# Patient Record
Sex: FEMALE | URBAN - METROPOLITAN AREA
[De-identification: names, ages, dates, MRNs, and addresses within clinical notes are randomized per-mention and may not be internally consistent; named-entity substitution may affect disease eponyms.]

---

## 2023-07-11 ENCOUNTER — OCCMED (OUTPATIENT)
Dept: URGENT CARE | Facility: CLINIC | Age: 33
End: 2023-07-11

## 2023-07-11 ENCOUNTER — APPOINTMENT (OUTPATIENT)
Dept: LAB | Facility: CLINIC | Age: 33
End: 2023-07-11

## 2023-07-11 DIAGNOSIS — Z02.1 PRE-EMPLOYMENT HEALTH SCREENING EXAMINATION: ICD-10-CM

## 2023-07-11 DIAGNOSIS — Z02.1 PRE-EMPLOYMENT HEALTH SCREENING EXAMINATION: Primary | ICD-10-CM

## 2023-07-11 LAB
MEV IGG SER QL IA: ABNORMAL
MUV IGG SER QL IA: NORMAL
RUBV IGG SERPL IA-ACNC: 18.4 IU/ML
VZV IGG SER QL IA: ABNORMAL

## 2023-07-11 PROCEDURE — 86735 MUMPS ANTIBODY: CPT

## 2023-07-11 PROCEDURE — 86765 RUBEOLA ANTIBODY: CPT

## 2023-07-11 PROCEDURE — 36415 COLL VENOUS BLD VENIPUNCTURE: CPT

## 2023-07-11 PROCEDURE — 86762 RUBELLA ANTIBODY: CPT

## 2023-07-11 PROCEDURE — 86787 VARICELLA-ZOSTER ANTIBODY: CPT

## 2023-07-11 PROCEDURE — 86480 TB TEST CELL IMMUN MEASURE: CPT

## 2023-07-13 LAB
GAMMA INTERFERON BACKGROUND BLD IA-ACNC: 0.04 IU/ML
M TB IFN-G BLD-IMP: NEGATIVE
M TB IFN-G CD4+ BCKGRND COR BLD-ACNC: 0.06 IU/ML
M TB IFN-G CD4+ BCKGRND COR BLD-ACNC: 0.07 IU/ML
MITOGEN IGNF BCKGRD COR BLD-ACNC: 2.3 IU/ML

## 2023-11-03 ENCOUNTER — HOSPITAL ENCOUNTER (EMERGENCY)
Facility: HOSPITAL | Age: 33
Discharge: HOME | End: 2023-11-03
Attending: EMERGENCY MEDICINE
Payer: OTHER MISCELLANEOUS

## 2023-11-03 VITALS
TEMPERATURE: 98.5 F | SYSTOLIC BLOOD PRESSURE: 123 MMHG | DIASTOLIC BLOOD PRESSURE: 60 MMHG | HEART RATE: 65 BPM | OXYGEN SATURATION: 99 % | RESPIRATION RATE: 18 BRPM
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
## 2023-11-03 LAB
ALT SERPL W P-5'-P-CCNC: 17 U/L (ref 7–52)
HBV SURFACE AB SER-ACNC: 62.4 MIU/ML
HBV SURFACE AG SER QL: NORMAL
HCV AB SER QL: NORMAL
HIV 1+2 AB+HIV1 P24 AG SERPL QL IA: NORMAL
HIV 2 AB SERPL QL IA: NORMAL
HIV1 AB SERPL QL IA: NORMAL
HIV1 P24 AG SERPL QL IA: NORMAL

## 2023-11-03 PROCEDURE — 86803 HEPATITIS C AB TEST: CPT

## 2023-11-03 PROCEDURE — 36415 COLL VENOUS BLD VENIPUNCTURE: CPT

## 2023-11-03 PROCEDURE — 84460 ALANINE AMINO (ALT) (SGPT): CPT

## 2023-11-03 PROCEDURE — 99284 EMERGENCY DEPT VISIT MOD MDM: CPT | Performed by: EMERGENCY MEDICINE

## 2023-11-03 PROCEDURE — 87389 HIV-1 AG W/HIV-1&-2 AB AG IA: CPT

## 2023-11-03 PROCEDURE — 99282 EMERGENCY DEPT VISIT SF MDM: CPT

## 2023-11-03 PROCEDURE — 86706 HEP B SURFACE ANTIBODY: CPT

## 2023-11-03 PROCEDURE — 87340 HEPATITIS B SURFACE AG IA: CPT

## 2023-11-03 NOTE — ED PROVIDER NOTES
Emergency Department Employee Health Note  Isabella Zhou 28 y.o. female MRN: [id number]  Unit/Bed#: ED 05/ED 05 Encounter: [id number]        History of Present Illness     Chief Complaint:   Chief Complaint   Patient presents with    Infectious Exposure - Needlestick     Pt had needle stick at work- left ring finger     HPI:  Jatinder Silva is a 28 y.o. female who presents with Needle stick injury. Mechanism:           Patient is a 16-year-old female no significant past medical history presenting with needlestick injury. Patient was doing a biopsy earlier in the day and then got back to her computer and noticed her finger was bleeding. Patient immediately cleaned the area resistant to changes. She then came to the emergency department for evaluation. Patient has no other complaints at this time. She denies headache, lightheadedness, chest pain, shortness of breath, abdominal pain, nausea, vomiting, diarrhea, urinary symptoms, numbness, tingling, or weakness. Review of Systems   All other systems reviewed and are negative. See HPI    Historical Information     Immunizations: There is no immunization history on file for this patient. History reviewed. No pertinent past medical history. History reviewed. No pertinent family history. History reviewed. No pertinent surgical history. Social History     Tobacco Use    Smoking status: Never    Smokeless tobacco: Never   Substance Use Topics    Alcohol use: Yes     Alcohol/week: 1.0 standard drink of alcohol     Types: 1 Glasses of wine per week     E-Cigarette/Vaping     E-Cigarette/Vaping Substances         Meds/Allergies   Prior to Admission Medications   Prescriptions Last Dose Informant Patient Reported?  Taking?   metFORMIN (GLUCOPHAGE) 500 mg tablet 11/2/2023  Yes Yes   Sig: Take 500 mg by mouth 2 (two) times a day with meals      Facility-Administered Medications: None       No Known Allergies    PHYSICAL EXAM  Physical Exam  Vitals and nursing note reviewed. Constitutional:       General: She is not in acute distress. Appearance: Normal appearance. She is not ill-appearing, toxic-appearing or diaphoretic. HENT:      Head: Normocephalic and atraumatic. Nose: Nose normal.      Mouth/Throat:      Mouth: Mucous membranes are moist.      Pharynx: Oropharynx is clear. Eyes:      Extraocular Movements: Extraocular movements intact. Cardiovascular:      Rate and Rhythm: Normal rate. Pulmonary:      Effort: Pulmonary effort is normal. No respiratory distress. Musculoskeletal:         General: Normal range of motion. Cervical back: Normal range of motion. Skin:     General: Skin is warm and dry. Capillary Refill: Capillary refill takes less than 2 seconds. Comments: Needlestick injury not visible during exam, but patient showed a picture on her cell phone showing small amount of blood on left ring finger   Neurological:      General: No focal deficit present. Mental Status: She is alert and oriented to person, place, and time. Psychiatric:         Mood and Affect: Mood normal.         Behavior: Behavior normal.         Thought Content:  Thought content normal.         Judgment: Judgment normal.         Vital Signs  ED Triage Vitals [11/03/23 1228]   Temperature Pulse Respirations Blood Pressure SpO2   98.5 °F (36.9 °C) 65 18 123/60 99 %      Temp Source Heart Rate Source Patient Position - Orthostatic VS BP Location FiO2 (%)   Oral Monitor Sitting Right arm --      Pain Score       No Pain           Vitals:    11/03/23 1228   BP: 123/60   Pulse: 65   Patient Position - Orthostatic VS: Sitting         Certification of Exposure:    (link to [address] [place] [address]: [address] [place] [address] ([email or web address]): find link to BBP Exposure Instructions under important links on center of the page)    The patient is stable and has a history and physical exam consistent with an [facility] Injury and based on my assessment this exposure is not significant. If “NonSignificant” nothing further needs to be filled out. If "Significant" please continue with the following questions    For Significant Exposures All of the following items must be completed:     Source Patient Name: Erika Kong Patient MRN: [id number]  Was the Source Patient's Provider contacted No   Was "Exposure Panel - Source" ordered (including Rapid HIV, HBsAg and anti-HCV) for Source Patient: No    Exposure Information    Type of Body Fluid Exposure: blood    Estimated volume of fluid: small up to 5cc     Exposure area: other: Finger needle stick    Skin Integrity: punctured    ED provider should review source patient chart for known history of HIV, Hepatitis B and Hepatitis C infection    Source patient HIV positive: No  Was the On-call Infectious Disease Provider contacted: No  Discussed treatment options with/for employee: Yes      There is no immunization history on file for this patient. Hepatitis B Vaccine History: There is no immunization history on file for this patient. The patient has Previously been vaccinated for Hepatitis B. HEPATITIS B IMMUNE GLOBULIN:  Not Indicated    Tetanus Vaccine History:    TDAP vaccination date: "within last 10 years"    The patient has Declined Tdap today    Employee/Patient post exposure testing Has been performed. "Exposure Panel - Employee Baseline"  (includes HBsAg, HBsAb, anti-HCV, ALT, HIV) with verbal consent and pretesting counseling for the patient Has been ordered.     Post-exposure Prophylaxis treatment options were discussed today: Not Indicated   and Nonsignificant      Visual Acuity      ED Medications  Medications - No data to display    Diagnostic Studies  Results Reviewed       Procedure Component Value Units Date/Time    ALT [[id number]]  (Normal) Collected: 11/03/23 1323    Lab Status: Final result Specimen: Blood from Arm, Left Updated: 11/03/23 1407     ALT 17 U/L     Hepatitis C antibody [[id number]] Collected: 11/03/23 1323    Lab Status: In process Specimen: Blood from Arm, Left Updated: 11/03/23 1328    Hepatitis B surface antibody [[id number]] Collected: 11/03/23 1323    Lab Status: In process Specimen: Blood from Arm, Left Updated: 11/03/23 1328    Hepatitis B surface antigen [[id number]] Collected: 11/03/23 1323    Lab Status: In process Specimen: Blood from Arm, Left Updated: 11/03/23 1328    HIV 1/2 AG/AB w Reflex [facility] for 2 yr old and above [[id number]] Collected: 11/03/23 1323    Lab Status: In process Specimen: Blood from Arm, Left Updated: 11/03/23 1328               No orders to display              Procedures  Procedures         Medical Decision Making  Patient is a 79-year-old female presenting for needlestick injury. Postexposure panel ordered. Patient was unable to get exposure panel from the original patient because she did not notice that she had a needlestick until the other patient was gone. Patient states that she is up-to-date with tetanus and hepatitis vaccines. Patient was cleared for discharge with occupational medicine follow-up. Amount and/or Complexity of Data Reviewed  Labs: ordered. Disposition  Final diagnoses:   Needlestick injury accident with exposure to body fluid     Time reflects when diagnosis was documented in both MDM as applicable and the Disposition within this note       Time User Action Codes Description Comment    11/3/2023  1:08 PM Maggie Ulloa.Elvie. 1XXA] Needlestick injury accident with exposure to body fluid           ED Disposition       ED Disposition   Discharge    Condition   Stable    Date/Time   Fri Nov 3, 2023  1:08 PM    Comment   Babs Zhou discharge to home/self care.                    Follow-up Information    None         Discharge Medication List as of 11/3/2023  1:09 PM        CONTINUE these medications which have NOT CHANGED    Details   metFORMIN (GLUCOPHAGE) 500 mg tablet Take 500 mg by mouth 2 (two) times a day with meals, Historical Med                 PDMP Review       None              ED Provider  Electronically Signed by               Elsie Alvarez MD  11/03/23 8778

## 2023-11-06 NOTE — ED ATTENDING ATTESTATION
11/3/2023  ITrace DO, saw and evaluated the patient. I have discussed the patient with the resident/non-physician practitioner and agree with the resident's/non-physician practitioner's findings, Plan of Care, and MDM as documented in the resident's/non-physician practitioner's note, except where noted. All available labs and Radiology studies were reviewed. I was present for key portions of any procedure(s) performed by the resident/non-physician practitioner and I was immediately available to provide assistance. At this point I agree with the current assessment done in the Emergency Department. I have conducted an independent evaluation of this patient a history and physical is as follows:    78-year-old female presents with needlestick injury. Patient was doing a biopsy earlier in the day and when she returned to her computer noted that her finger was bleeding. She immediately cleaned the area. Source patient had already been discharged as this was a same-day procedure. Patient denies other complaints. On exam-no acute distress, heart regular, no respiratory distress, no obvious bleeding or wound noted on finger at this time.   Plan-we will draw exposure panel labs, refer to employee health    ED Course         Critical Care Time  Procedures

## 2024-02-19 LAB
HBV SURFACE AG SER QL: NONREACTIVE
HCV AB SER QL: NONREACTIVE
HIV 1+2 AB+HIV1 P24 AG SERPL QL IA: NONREACTIVE
QST CHLAMYDIA TRACHOMATIS RNA, TMA: NEGATIVE
QST NEISSERIA GONORRHOEAE RNA, TMA: NEGATIVE
RPR SER QL: NORMAL
RUBELLA IGG SCREEN: NORMAL
T PALLIDUM AB SER QL IF: NONREACTIVE

## 2024-02-22 ENCOUNTER — TRANSCRIBE ORDERS (OUTPATIENT)
Dept: SCHEDULING | Age: 34
End: 2024-02-22

## 2024-02-22 DIAGNOSIS — Z36.82 ENCOUNTER FOR ANTENATAL SCREENING FOR NUCHAL TRANSLUCENCY: Primary | ICD-10-CM

## 2024-03-15 ENCOUNTER — HOSPITAL ENCOUNTER (OUTPATIENT)
Dept: PERINATAL CARE | Facility: HOSPITAL | Age: 34
Discharge: HOME | End: 2024-03-15
Attending: NURSE PRACTITIONER
Payer: COMMERCIAL

## 2024-03-15 DIAGNOSIS — O36.80X0 ENCOUNTER TO DETERMINE FETAL VIABILITY OF PREGNANCY, SINGLE OR UNSPECIFIED FETUS: Primary | ICD-10-CM

## 2024-03-15 DIAGNOSIS — Z36.3 ANTENATAL SCREENING FOR MALFORMATION USING ULTRASONICS: ICD-10-CM

## 2024-03-15 DIAGNOSIS — Z3A.13 13 WEEKS GESTATION OF PREGNANCY: ICD-10-CM

## 2024-03-15 DIAGNOSIS — Z36.82 ENCOUNTER FOR ANTENATAL SCREENING FOR NUCHAL TRANSLUCENCY: ICD-10-CM

## 2024-03-15 PROCEDURE — 76801 OB US < 14 WKS SINGLE FETUS: CPT

## 2024-03-29 ENCOUNTER — TRANSCRIBE ORDERS (OUTPATIENT)
Dept: SCHEDULING | Age: 34
End: 2024-03-29

## 2024-03-29 DIAGNOSIS — Z36.0 ENCOUNTER FOR ANTENATAL SCREENING FOR CHROMOSOMAL ANOMALIES: ICD-10-CM

## 2024-03-29 DIAGNOSIS — Z34.02 ENCOUNTER FOR SUPERVISION OF NORMAL FIRST PREGNANCY, SECOND TRIMESTER: Primary | ICD-10-CM

## 2024-03-29 DIAGNOSIS — Z36.3 ENCOUNTER FOR ANTENATAL SCREENING FOR MALFORMATIONS: ICD-10-CM

## 2024-04-24 ENCOUNTER — OFFICE VISIT (OUTPATIENT)
Dept: PRIMARY CARE | Facility: CLINIC | Age: 34
End: 2024-04-24
Payer: COMMERCIAL

## 2024-04-24 VITALS
OXYGEN SATURATION: 98 % | BODY MASS INDEX: 27.58 KG/M2 | SYSTOLIC BLOOD PRESSURE: 100 MMHG | HEIGHT: 68 IN | WEIGHT: 182 LBS | DIASTOLIC BLOOD PRESSURE: 70 MMHG | HEART RATE: 61 BPM | TEMPERATURE: 97.8 F

## 2024-04-24 DIAGNOSIS — Z00.00 ANNUAL PHYSICAL EXAM: Primary | ICD-10-CM

## 2024-04-24 PROBLEM — G47.33 OSA (OBSTRUCTIVE SLEEP APNEA): Status: ACTIVE | Noted: 2024-04-24

## 2024-04-24 PROBLEM — E28.2 PCOS (POLYCYSTIC OVARIAN SYNDROME): Status: ACTIVE | Noted: 2024-04-24

## 2024-04-24 PROCEDURE — 99385 PREV VISIT NEW AGE 18-39: CPT | Performed by: FAMILY MEDICINE

## 2024-04-24 PROCEDURE — 3008F BODY MASS INDEX DOCD: CPT | Performed by: FAMILY MEDICINE

## 2024-04-24 ASSESSMENT — PATIENT HEALTH QUESTIONNAIRE - PHQ9: SUM OF ALL RESPONSES TO PHQ9 QUESTIONS 1 & 2: 0

## 2024-04-24 NOTE — PROGRESS NOTES
[facility]    [address]  [facility], [place] [address]  Tel: [phone]   Fax: [phone]     History of Present Illness     Subjective     Patient ID: Adeline Corral is a 33 y.o. female.  Establish Care (New to area need PCP.....Pregnant BARBARA 9/18/24--confirmed Axia)    HPI    Social history    Tobacco: denies     Alcohol: not currently    Drugs: denies   Work: breast imaging radiologist     Lives with:       Diet: no particular diet   Exercise: usually 3-5 times a week       Health Maintenance  Dental exam: 2 years ago   Vision exam: a couple years ago   Last PHQ-2/9: neg     Pap smear: 2022   Hep C:  neg in care everywhere   HIV: neg in care everywhere     Flu: out of season   COVID:  pt reports up to date   Tdap: will get in pregnancy        Past Medical/Surgical/Family/Social History         The following have been reviewed and updated as appropriate in this visit:   Tobacco  Allergies  Meds  Problems  Med Hx  Surg Hx  Fam Hx         History reviewed. No pertinent past medical history.    Past Surgical History:   Procedure Laterality Date    KNEE SURGERY Left 2009       Family History   Problem Relation Age of Onset    No Known Problems Biological Mother     No Known Problems Biological Father     Sleep apnea Biological Brother     Sleep apnea Biological Brother     Lung cancer Paternal Grandmother     Lung cancer Paternal Grandfather     Breast cancer Neg Hx     Colon cancer Neg Hx        Social History     Tobacco Use    Smoking status: Never    Smokeless tobacco: Never   Substance Use Topics    Alcohol use: Not Currently     Comment: not since pregnancy       Patient Care Team:  Tino Dowd DO as PCP - General (Family Medicine)  Rukhsana Dykes MD as Obstetrician (Obstetrics and Gynecology)     Allergies and Medications       No Known Allergies      Current Outpatient Medications   Medication Sig Dispense Refill    PNV  "no.153/FA/om3/dha/epa/fish (PRENATAL GUMMIES ORAL) Take 1 Units by mouth daily.       No current facility-administered medications for this visit.        Review of Systems       Review of Systems As noted in HPI      Physical Examination       Objective     Visit Vitals  /70 (BP Location: Right upper arm, Patient Position: Sitting)   Pulse 61   Temp 36.6 °C (97.8 °F)   Ht 1.727 m (5' 8\")   Wt 82.6 kg (182 lb)   SpO2 98%   BMI 27.67 kg/m²         Physical Exam  Vitals and nursing note reviewed.   Constitutional:       General: She is not in acute distress.     Appearance: Normal appearance. She is well-developed. She is not ill-appearing, toxic-appearing or diaphoretic.   HENT:      Head: Normocephalic and atraumatic.      Right Ear: Tympanic membrane, ear canal and external ear normal.      Left Ear: Tympanic membrane, ear canal and external ear normal.      Nose: Nose normal.      Mouth/Throat:      Mouth: Mucous membranes are moist.      Pharynx: No oropharyngeal exudate or posterior oropharyngeal erythema.   Eyes:      General: Lids are normal. No scleral icterus.        Right eye: No discharge.         Left eye: No discharge.      Extraocular Movements: Extraocular movements intact.      Conjunctiva/sclera: Conjunctivae normal.   Neck:      Thyroid: No thyromegaly.   Cardiovascular:      Rate and Rhythm: Normal rate and regular rhythm.      Pulses: Normal pulses.      Heart sounds: Normal heart sounds. No murmur heard.     No friction rub. No gallop.   Pulmonary:      Effort: Pulmonary effort is normal. No respiratory distress.      Breath sounds: Normal breath sounds. No wheezing, rhonchi or rales.   Musculoskeletal:      Cervical back: Neck supple.      Right lower leg: No edema.      Left lower leg: No edema.   Lymphadenopathy:      Cervical: No cervical adenopathy.   Skin:     General: Skin is warm and dry.   Neurological:      General: No focal deficit present.      Mental Status: She is alert and " oriented to person, place, and time.   Psychiatric:         Mood and Affect: Mood normal.         Behavior: Behavior normal.          Assessment and Plan       Diagnoses and all orders for this visit:    Annual physical exam (Primary)  Assessment & Plan:  All medical, surgical, social, and family medical histories reviewed and updated  Age appropriate screenings and vaccines ordered as indicated   Asked patient to provide prior vaccinations records as indicated above     Lifestyle recommendations:  Diet: have a balanced diet of fruits and vegetables, protein (lean meat, fish, nuts, beans, quinoa), use extra virgin olive oil when cooking, 25 g of fiber daily.  Check out http://www.myplate.gov for some pointers!  Drink unsweetened beverages and aim to get 6-8 glasses of water daily   Exercise:   Try to get 150 minutes of moderate exercise per week (cardio, weight training, flexibility, whatever you enjoy!)  Achieve NEAT (Non-exercise activity thermogenesis) every day: hourly movements while awake for at least 250 steps (approximately 5 minutes every hour). Hourly movement prevents metabolic slowdown that can happen with prolonged sitting   Other things to keep you healthy:  Dental check ups twice per year,   Eye exams once per year  Skin: Use sunscreen - SPF >15, check skin moles regularly  Safety:   Wear a seatbelt!  Wear a helmet (when on a bike/motorcycle)!  Please, please no texting and driving.                  Dr.Navneet Dowd, DO  [facility]  4/24/2024

## 2024-04-24 NOTE — ASSESSMENT & PLAN NOTE
All medical, surgical, social, and family medical histories reviewed and updated  Age appropriate screenings and vaccines ordered as indicated   Asked patient to provide prior vaccinations records as indicated above     Lifestyle recommendations:  Diet: have a balanced diet of fruits and vegetables, protein (lean meat, fish, nuts, beans, quinoa), use extra virgin olive oil when cooking, 25 g of fiber daily.  Check out http://www.myplate.gov for some pointers!  Drink unsweetened beverages and aim to get 6-8 glasses of water daily   Exercise:   Try to get 150 minutes of moderate exercise per week (cardio, weight training, flexibility, whatever you enjoy!)  Achieve NEAT (Non-exercise activity thermogenesis) every day: hourly movements while awake for at least 250 steps (approximately 5 minutes every hour). Hourly movement prevents metabolic slowdown that can happen with prolonged sitting   Other things to keep you healthy:  Dental check ups twice per year,   Eye exams once per year  Skin: Use sunscreen - SPF >15, check skin moles regularly  Safety:   Wear a seatbelt!  Wear a helmet (when on a bike/motorcycle)!  Please, please no texting and driving.

## 2024-05-09 ENCOUNTER — HOSPITAL ENCOUNTER (OUTPATIENT)
Dept: PERINATAL CARE | Facility: HOSPITAL | Age: 34
Discharge: HOME | End: 2024-05-09
Attending: OBSTETRICS & GYNECOLOGY
Payer: COMMERCIAL

## 2024-05-09 DIAGNOSIS — Z3A.21 21 WEEKS GESTATION OF PREGNANCY: ICD-10-CM

## 2024-05-09 DIAGNOSIS — Z36.3 ENCOUNTER FOR ROUTINE SCREENING FOR MALFORMATION USING ULTRASONICS: Primary | Chronic | ICD-10-CM

## 2024-05-09 DIAGNOSIS — Z36.82 ENCOUNTER FOR NUCHAL TRANSLUCENCY TESTING: ICD-10-CM

## 2024-05-09 PROCEDURE — 76805 OB US >/= 14 WKS SNGL FETUS: CPT

## 2024-08-23 LAB — GP B STREP SPEC QL CULT: NORMAL

## 2024-09-22 ENCOUNTER — HOSPITAL ENCOUNTER (INPATIENT)
Facility: HOSPITAL | Age: 34
LOS: 3 days | Discharge: HOME | End: 2024-09-25
Attending: OBSTETRICS & GYNECOLOGY | Admitting: STUDENT IN AN ORGANIZED HEALTH CARE EDUCATION/TRAINING PROGRAM
Payer: COMMERCIAL

## 2024-09-22 PROBLEM — Z34.90 TERM PREGNANCY: Status: ACTIVE | Noted: 2024-09-22

## 2024-09-22 LAB
ABO + RH BLD: NORMAL
BLD GP AB SCN SERPL QL: NEGATIVE
D AG BLD QL: POSITIVE
ERYTHROCYTE [DISTWIDTH] IN BLOOD BY AUTOMATED COUNT: 12.6 % (ref 11.7–14.4)
HCT VFR BLD AUTO: 28.7 % (ref 35–45)
HGB BLD-MCNC: 9.5 G/DL (ref 11.8–15.7)
LABORATORY COMMENT REPORT: NORMAL
MCH RBC QN AUTO: 27.3 PG (ref 28–33.2)
MCHC RBC AUTO-ENTMCNC: 33.1 G/DL (ref 32.2–35.5)
MCV RBC AUTO: 82.5 FL (ref 83–98)
PDW BLD AUTO: 11.3 FL (ref 9.4–12.3)
PLATELET # BLD AUTO: 221 K/UL (ref 150–369)
RBC # BLD AUTO: 3.48 M/UL (ref 3.93–5.22)
SPECIMEN EXP DATE BLD: NORMAL
WBC # BLD AUTO: 8.81 K/UL (ref 3.8–10.5)

## 2024-09-22 PROCEDURE — 3E0P7VZ INTRODUCTION OF HORMONE INTO FEMALE REPRODUCTIVE, VIA NATURAL OR ARTIFICIAL OPENING: ICD-10-PCS | Performed by: STUDENT IN AN ORGANIZED HEALTH CARE EDUCATION/TRAINING PROGRAM

## 2024-09-22 PROCEDURE — 86901 BLOOD TYPING SEROLOGIC RH(D): CPT

## 2024-09-22 PROCEDURE — 85027 COMPLETE CBC AUTOMATED: CPT | Performed by: STUDENT IN AN ORGANIZED HEALTH CARE EDUCATION/TRAINING PROGRAM

## 2024-09-22 PROCEDURE — 3E033VJ INTRODUCTION OF OTHER HORMONE INTO PERIPHERAL VEIN, PERCUTANEOUS APPROACH: ICD-10-PCS | Performed by: STUDENT IN AN ORGANIZED HEALTH CARE EDUCATION/TRAINING PROGRAM

## 2024-09-22 PROCEDURE — 12000000 HC ROOM AND CARE MED/SURG

## 2024-09-22 PROCEDURE — 36415 COLL VENOUS BLD VENIPUNCTURE: CPT | Performed by: STUDENT IN AN ORGANIZED HEALTH CARE EDUCATION/TRAINING PROGRAM

## 2024-09-22 PROCEDURE — 86780 TREPONEMA PALLIDUM: CPT | Performed by: STUDENT IN AN ORGANIZED HEALTH CARE EDUCATION/TRAINING PROGRAM

## 2024-09-22 PROCEDURE — 86850 RBC ANTIBODY SCREEN: CPT

## 2024-09-22 PROCEDURE — 87340 HEPATITIS B SURFACE AG IA: CPT | Performed by: STUDENT IN AN ORGANIZED HEALTH CARE EDUCATION/TRAINING PROGRAM

## 2024-09-22 PROCEDURE — 63700000 HC SELF-ADMINISTRABLE DRUG: Performed by: STUDENT IN AN ORGANIZED HEALTH CARE EDUCATION/TRAINING PROGRAM

## 2024-09-22 RX ORDER — CARBOPROST TROMETHAMINE 250 UG/ML
250 INJECTION, SOLUTION INTRAMUSCULAR ONCE AS NEEDED
Status: DISCONTINUED | OUTPATIENT
Start: 2024-09-22 | End: 2024-09-23

## 2024-09-22 RX ORDER — ONDANSETRON HYDROCHLORIDE 2 MG/ML
4 INJECTION, SOLUTION INTRAVENOUS EVERY 6 HOURS PRN
Status: DISCONTINUED | OUTPATIENT
Start: 2024-09-22 | End: 2024-09-23

## 2024-09-22 RX ORDER — DIPHENHYDRAMINE HCL 25 MG
50 CAPSULE ORAL NIGHTLY PRN
Status: DISCONTINUED | OUTPATIENT
Start: 2024-09-22 | End: 2024-09-23

## 2024-09-22 RX ORDER — SODIUM CHLORIDE, SODIUM LACTATE, POTASSIUM CHLORIDE, CALCIUM CHLORIDE 600; 310; 30; 20 MG/100ML; MG/100ML; MG/100ML; MG/100ML
125 INJECTION, SOLUTION INTRAVENOUS CONTINUOUS
Status: DISCONTINUED | OUTPATIENT
Start: 2024-09-22 | End: 2024-09-23

## 2024-09-22 RX ORDER — CALCIUM CARBONATE 200(500)MG
400 TABLET,CHEWABLE ORAL 3 TIMES DAILY PRN
Status: DISCONTINUED | OUTPATIENT
Start: 2024-09-22 | End: 2024-09-23

## 2024-09-22 RX ORDER — OXYTOCIN 10 [USP'U]/ML
10 INJECTION, SOLUTION INTRAMUSCULAR; INTRAVENOUS ONCE AS NEEDED
Status: COMPLETED | OUTPATIENT
Start: 2024-09-22 | End: 2024-09-23

## 2024-09-22 RX ORDER — ONDANSETRON 4 MG/1
4 TABLET, ORALLY DISINTEGRATING ORAL EVERY 6 HOURS PRN
Status: DISCONTINUED | OUTPATIENT
Start: 2024-09-22 | End: 2024-09-23

## 2024-09-22 RX ORDER — TRANEXAMIC ACID 10 MG/ML
1000 INJECTION, SOLUTION INTRAVENOUS ONCE AS NEEDED
Status: DISCONTINUED | OUTPATIENT
Start: 2024-09-22 | End: 2024-09-23

## 2024-09-22 RX ORDER — METHYLERGONOVINE MALEATE 0.2 MG/ML
0.2 INJECTION INTRAVENOUS ONCE AS NEEDED
Status: DISCONTINUED | OUTPATIENT
Start: 2024-09-22 | End: 2024-09-23

## 2024-09-22 RX ORDER — MISOPROSTOL 200 UG/1
1000 TABLET ORAL ONCE AS NEEDED
Status: DISCONTINUED | OUTPATIENT
Start: 2024-09-22 | End: 2024-09-23

## 2024-09-22 RX ADMIN — DINOPROSTONE 10 MG: 10 INSERT VAGINAL at 20:50

## 2024-09-23 ENCOUNTER — ANESTHESIA EVENT (INPATIENT)
Dept: OBSTETRICS AND GYNECOLOGY | Facility: HOSPITAL | Age: 34
End: 2024-09-23
Payer: COMMERCIAL

## 2024-09-23 ENCOUNTER — ANESTHESIA (INPATIENT)
Dept: OBSTETRICS AND GYNECOLOGY | Facility: HOSPITAL | Age: 34
End: 2024-09-23
Payer: COMMERCIAL

## 2024-09-23 LAB
ABO + RH BLD: NORMAL
D AG BLD QL: POSITIVE
HBV SURFACE AG SER QL: NONREACTIVE
T PALLIDUM AB SER QL IF: NONREACTIVE

## 2024-09-23 PROCEDURE — 0UQGXZZ REPAIR VAGINA, EXTERNAL APPROACH: ICD-10-PCS | Performed by: OBSTETRICS & GYNECOLOGY

## 2024-09-23 PROCEDURE — 63700000 HC SELF-ADMINISTRABLE DRUG: Performed by: OBSTETRICS & GYNECOLOGY

## 2024-09-23 PROCEDURE — 63600000 HC DRUGS/DETAIL CODE: Mod: JZ | Performed by: OBSTETRICS & GYNECOLOGY

## 2024-09-23 PROCEDURE — 12000000 HC ROOM AND CARE MED/SURG

## 2024-09-23 PROCEDURE — 72000011 HC VAGINAL DELIVERY LEVEL 1

## 2024-09-23 PROCEDURE — 63600000 HC DRUGS/DETAIL CODE: Mod: JZ | Performed by: STUDENT IN AN ORGANIZED HEALTH CARE EDUCATION/TRAINING PROGRAM

## 2024-09-23 PROCEDURE — 25000000 HC PHARMACY GENERAL: Performed by: STUDENT IN AN ORGANIZED HEALTH CARE EDUCATION/TRAINING PROGRAM

## 2024-09-23 PROCEDURE — 27200130 HC EPIDURAL ANES TRAY

## 2024-09-23 PROCEDURE — 63600000 HC DRUGS/DETAIL CODE: Mod: JG | Performed by: ANESTHESIOLOGY

## 2024-09-23 PROCEDURE — 63700000 HC SELF-ADMINISTRABLE DRUG: Performed by: STUDENT IN AN ORGANIZED HEALTH CARE EDUCATION/TRAINING PROGRAM

## 2024-09-23 PROCEDURE — 25000000 HC PHARMACY GENERAL: Performed by: OBSTETRICS & GYNECOLOGY

## 2024-09-23 PROCEDURE — 37000005 HC ANESTHESIA EPIDURAL/SPINAL: Performed by: ANESTHESIOLOGY

## 2024-09-23 PROCEDURE — 25000000 HC PHARMACY GENERAL: Performed by: ANESTHESIOLOGY

## 2024-09-23 RX ORDER — LIDOCAINE HYDROCHLORIDE AND EPINEPHRINE 15; 5 MG/ML; UG/ML
INJECTION, SOLUTION EPIDURAL AS NEEDED
Status: DISCONTINUED | OUTPATIENT
Start: 2024-09-23 | End: 2024-09-23 | Stop reason: SURG

## 2024-09-23 RX ORDER — AMOXICILLIN 250 MG
1 CAPSULE ORAL 2 TIMES DAILY
Status: DISCONTINUED | OUTPATIENT
Start: 2024-09-23 | End: 2024-09-25 | Stop reason: HOSPADM

## 2024-09-23 RX ORDER — EPHEDRINE SULFATE/0.9% NACL/PF 50 MG/5 ML
10 SYRINGE (ML) INTRAVENOUS EVERY 10 MIN PRN
Status: DISCONTINUED | OUTPATIENT
Start: 2024-09-23 | End: 2024-09-23

## 2024-09-23 RX ORDER — CALCIUM CARBONATE 200(500)MG
200 TABLET,CHEWABLE ORAL EVERY 4 HOURS PRN
Status: DISCONTINUED | OUTPATIENT
Start: 2024-09-23 | End: 2024-09-25 | Stop reason: HOSPADM

## 2024-09-23 RX ORDER — BUPIVACAINE HYDROCHLORIDE 2.5 MG/ML
INJECTION, SOLUTION EPIDURAL; INFILTRATION; INTRACAUDAL AS NEEDED
Status: DISCONTINUED | OUTPATIENT
Start: 2024-09-23 | End: 2024-09-23 | Stop reason: SURG

## 2024-09-23 RX ORDER — MISOPROSTOL 100 UG/1
50 TABLET ORAL EVERY 4 HOURS
Status: DISCONTINUED | OUTPATIENT
Start: 2024-09-23 | End: 2024-09-23

## 2024-09-23 RX ORDER — OXYTOCIN/0.9 % SODIUM CHLORIDE 30/500 ML
83 PLASTIC BAG, INJECTION (ML) INTRAVENOUS CONTINUOUS
Status: DISCONTINUED | OUTPATIENT
Start: 2024-09-23 | End: 2024-09-23

## 2024-09-23 RX ORDER — ONDANSETRON HYDROCHLORIDE 2 MG/ML
4 INJECTION, SOLUTION INTRAVENOUS EVERY 8 HOURS PRN
Status: DISCONTINUED | OUTPATIENT
Start: 2024-09-23 | End: 2024-09-25 | Stop reason: HOSPADM

## 2024-09-23 RX ORDER — ONDANSETRON 4 MG/1
4 TABLET, ORALLY DISINTEGRATING ORAL EVERY 8 HOURS PRN
Status: DISCONTINUED | OUTPATIENT
Start: 2024-09-23 | End: 2024-09-25 | Stop reason: HOSPADM

## 2024-09-23 RX ORDER — OXYTOCIN/0.9 % SODIUM CHLORIDE 30/500 ML
667 PLASTIC BAG, INJECTION (ML) INTRAVENOUS ONCE
Status: COMPLETED | OUTPATIENT
Start: 2024-09-23 | End: 2024-09-23

## 2024-09-23 RX ORDER — DIPHENHYDRAMINE HCL 50 MG/ML
25 VIAL (ML) INJECTION EVERY 6 HOURS PRN
Status: DISCONTINUED | OUTPATIENT
Start: 2024-09-23 | End: 2024-09-25 | Stop reason: HOSPADM

## 2024-09-23 RX ORDER — OXYTOCIN/0.9 % SODIUM CHLORIDE 30/500 ML
0-20 PLASTIC BAG, INJECTION (ML) INTRAVENOUS CONTINUOUS
Status: DISCONTINUED | OUTPATIENT
Start: 2024-09-23 | End: 2024-09-23

## 2024-09-23 RX ORDER — ALUMINUM HYDROXIDE, MAGNESIUM HYDROXIDE, AND SIMETHICONE 1200; 120; 1200 MG/30ML; MG/30ML; MG/30ML
30 SUSPENSION ORAL EVERY 4 HOURS PRN
Status: DISCONTINUED | OUTPATIENT
Start: 2024-09-23 | End: 2024-09-25 | Stop reason: HOSPADM

## 2024-09-23 RX ORDER — SODIUM CHLORIDE, SODIUM LACTATE, POTASSIUM CHLORIDE, CALCIUM CHLORIDE 600; 310; 30; 20 MG/100ML; MG/100ML; MG/100ML; MG/100ML
125 INJECTION, SOLUTION INTRAVENOUS CONTINUOUS
Status: DISCONTINUED | OUTPATIENT
Start: 2024-09-23 | End: 2024-09-23

## 2024-09-23 RX ORDER — FENTANYL/ROPIVACAINE/NS/PF 2-1500 MCG
PREFILLED PUMP RESERVOIR INJECTION CONTINUOUS
Status: DISCONTINUED | OUTPATIENT
Start: 2024-09-23 | End: 2024-09-23

## 2024-09-23 RX ORDER — ACETAMINOPHEN 325 MG/1
650 TABLET ORAL EVERY 4 HOURS PRN
Status: DISCONTINUED | OUTPATIENT
Start: 2024-09-23 | End: 2024-09-25 | Stop reason: HOSPADM

## 2024-09-23 RX ORDER — DIBUCAINE 1 %
1 OINTMENT (GRAM) TOPICAL AS NEEDED
Status: DISCONTINUED | OUTPATIENT
Start: 2024-09-23 | End: 2024-09-25 | Stop reason: HOSPADM

## 2024-09-23 RX ORDER — LIDOCAINE HYDROCHLORIDE 10 MG/ML
0-30 INJECTION, SOLUTION EPIDURAL; INFILTRATION; INTRACAUDAL; PERINEURAL ONCE AS NEEDED
Status: DISCONTINUED | OUTPATIENT
Start: 2024-09-23 | End: 2024-09-23

## 2024-09-23 RX ORDER — IBUPROFEN 600 MG/1
600 TABLET ORAL EVERY 6 HOURS PRN
Status: DISCONTINUED | OUTPATIENT
Start: 2024-09-23 | End: 2024-09-25 | Stop reason: HOSPADM

## 2024-09-23 RX ORDER — DIPHENHYDRAMINE HCL 25 MG
25 CAPSULE ORAL EVERY 6 HOURS PRN
Status: DISCONTINUED | OUTPATIENT
Start: 2024-09-23 | End: 2024-09-25 | Stop reason: HOSPADM

## 2024-09-23 RX ADMIN — LIDOCAINE HYDROCHLORIDE,EPINEPHRINE BITARTRATE 3 ML: 15; .005 INJECTION, SOLUTION EPIDURAL; INFILTRATION; INTRACAUDAL; PERINEURAL at 14:24

## 2024-09-23 RX ADMIN — Medication 667 MILLI-UNITS/MIN: at 20:30

## 2024-09-23 RX ADMIN — BENZOCAINE AND LEVOMENTHOL: 200; 5 SPRAY TOPICAL at 23:03

## 2024-09-23 RX ADMIN — DOCUSATE SODIUM AND SENNOSIDES 1 TABLET: 8.6; 5 TABLET, FILM COATED ORAL at 23:02

## 2024-09-23 RX ADMIN — ANTACID TABLETS 400 MG OF ELEMENTAL CALCIUM: 500 TABLET, CHEWABLE ORAL at 20:04

## 2024-09-23 RX ADMIN — IBUPROFEN 600 MG: 600 TABLET, FILM COATED ORAL at 23:03

## 2024-09-23 RX ADMIN — Medication 50 ML: at 17:20

## 2024-09-23 RX ADMIN — Medication 83 MILLI-UNITS/MIN: at 21:03

## 2024-09-23 RX ADMIN — Medication 50 ML: at 14:13

## 2024-09-23 RX ADMIN — OXYTOCIN 10 UNITS: 10 INJECTION, SOLUTION INTRAMUSCULAR; INTRAVENOUS at 20:25

## 2024-09-23 RX ADMIN — SODIUM CHLORIDE, POTASSIUM CHLORIDE, SODIUM LACTATE AND CALCIUM CHLORIDE 125 ML/HR: 600; 310; 30; 20 INJECTION, SOLUTION INTRAVENOUS at 19:28

## 2024-09-23 RX ADMIN — MISOPROSTOL 50 MCG: 100 TABLET ORAL at 09:47

## 2024-09-23 RX ADMIN — ONDANSETRON 4 MG: 2 INJECTION INTRAMUSCULAR; INTRAVENOUS at 16:22

## 2024-09-23 RX ADMIN — BUPIVACAINE HYDROCHLORIDE 8 ML: 2.5 INJECTION, SOLUTION EPIDURAL; INFILTRATION; INTRACAUDAL; PERINEURAL at 14:27

## 2024-09-23 RX ADMIN — Medication 2 MILLI-UNITS/MIN: at 15:00

## 2024-09-23 RX ADMIN — SODIUM CHLORIDE, POTASSIUM CHLORIDE, SODIUM LACTATE AND CALCIUM CHLORIDE 125 ML/HR: 600; 310; 30; 20 INJECTION, SOLUTION INTRAVENOUS at 14:10

## 2024-09-23 ASSESSMENT — COGNITIVE AND FUNCTIONAL STATUS - GENERAL: DO YOU HAVE SERIOUS DIFFICULTY WALKING OR CLIMBING STAIRS: NO

## 2024-09-23 NOTE — H&P
HPI     Adeline Corral is a 33 y.o. female  at 40w4d with an estimated due date of 2024, Date entered prior to episode creation who presents with scheduled IOL. PMH of PCOS.     Normal FM no LOF no VB    PNC uncomplicated  32w sono: 54%ile, HC:AC 1.1        OB History:   OB History    Para Term  AB Living   1 0 0 0 0 0   SAB IAB Ectopic Multiple Live Births   0 0 0 0 0      # Outcome Date GA Lbr [place]/2nd Weight Sex Type Anes PTL Lv   1 Current                Medical History:   Past Medical History:   Diagnosis Date    Polycystic ovary syndrome        Surgical History:   Past Surgical History   Procedure Laterality Date    Knee surgery Left        Social History:   Social History     Socioeconomic History    Marital status:      Spouse name: None    Number of children: None    Years of education: None    Highest education level: None   Tobacco Use    Smoking status: Never    Smokeless tobacco: Never   Substance and Sexual Activity    Alcohol use: Not Currently     Comment: not since pregnancy    Drug use: Never    Sexual activity: Yes     Partners: Male        Family History:   Family History   Problem Relation Name Age of Onset    No Known Problems Biological Mother      No Known Problems Biological Father      Sleep apnea Biological Brother      Sleep apnea Biological Brother      Lung cancer Paternal Grandmother      Lung cancer Paternal Grandfather      Breast cancer Neg Hx      Colon cancer Neg Hx         Allergies: Patient has no known allergies.    Prior to Admission medications    Medication Sig Start Date End Date Taking? Authorizing Provider   PNV no.153/FA/om3/dha/epa/fish (PRENATAL GUMMIES ORAL) Take 1 Units by mouth daily.    Provider, [facility] Historical, MD       Review of Systems  Pertinent items are noted in HPI.    Objective     Vital Signs for the last 24 hours:       Latest cervical exam:         EFM baseline 130 mod michael accel no decel  [place]:  irreg  Labs:  Rubella IgG Scr   Date Value Ref Range Status   2024 Immune  Final       Assessment/Plan     Adeline Corral is a 33 y.o. female  at 40w4d admitted for elective induction of labor     FHR: Category I  GBS: negative 24    Routine labs  Cervidil     Ophelia Potter MD

## 2024-09-23 NOTE — PROGRESS NOTES
Labor and Delivery Progress Note    Subjective     Interval History: none.reports some pressure    Patient comfortable with epidural    Objective     Vital Signs for the last 24 hours:  Temp:  [36.5 °C (97.7 °F)-37 °C (98.6 °F)] 37 °C (98.6 °F)  Heart Rate:  [65-91] 84  Resp:  [16-20] 18  BP: (106-150)/(55-88) 120/75     Fetal Monitoring:  FHR Baseline: 140  FHR Variability: moderate  FHR Accelerations: present  FHR Decelerations: absent    Contraction Frequency: q 2 to 3    IUPC: no    Latest cervical exam:  Cervical Dilation (cm): 10  Cervical Effacement: 100  Fetal Station: +1  Method: sterile exam per provider (24)    Vaginal Bleeding: not present (24 1430)      Current Facility-Administered Medications:     calcium (as carbonate) (TUMS) chewable tablet 400 mg of elemental calcium, 400 mg of elemental calcium, oral, 3x daily PRN, Ophelia Potter MD    carboprost (HEMABATE) injection 250 mcg, 250 mcg, intramuscular, Once PRN, Ophelia Potter MD    diphenhydrAMINE (BENADRYL) capsule 50 mg, 50 mg, oral, Nightly PRN, Ophelia Potter MD    ePHEDrine sulfate-0.9%NaCl(PF) 50 mg/5 mL (10 mg/mL) injection 10 mg, 10 mg, intravenous, q10 min PRN, Sharmin Herman MD    fentaNYL-ROPivacaine-NaCl (PF) 2 mcg/mL - 0.15% PCEA syringe, , epidural, Continuous, Sharmin Herman MD, 50 mL at 24 1720    lactated ringer's bolus 500 mL, 500 mL, intravenous, Once, Sharmin Herman MD    [] lactated ringer's bolus 1,000 mL, 1,000 mL, intravenous, Once **FOLLOWED BY** lactated ringer's infusion, 125 mL/hr, intravenous, Continuous, Ophelia Potter MD, Last Rate: 125 mL/hr at 24 1410, 125 mL/hr at 24 1410    lactated ringer's infusion, 125 mL/hr, intravenous, Continuous, Last Rate: 125 mL/hr at 24 1928, 125 mL/hr at 24 **AND** oxytocin in 0.9 % NSS (PITOCIN) 30 unit/500 mL infusion, 0-20 esme-units/min, intravenous, Continuous, Keeley Rucker MD, Last Rate: 4 mL/hr at 24 1545, 4 esme-units/min at  24 1545    methylergonovine (METHERGINE) injection 0.2 mg, 0.2 mg, intramuscular, Once PRN, Ophelia Potter MD    miSOPROStoL (CYTOTEC) tablet 1,000 mcg, 1,000 mcg, rectal, Once PRN, Ophelia Potter MD    ondansetron ODT (ZOFRAN-ODT) disintegrating tablet 4 mg, 4 mg, oral, q6h PRN **OR** ondansetron (ZOFRAN) injection 4 mg, 4 mg, intravenous, q6h PRN, Ophelia Potter MD, 4 mg at 24 1622    oxytocin (PITOCIN) injection 10 Units, 10 Units, intramuscular, Once PRN, Ophelia Potter MD    tranexamic acid (CYKLOKAPRON) 1000 mg/100 mL sodium chloride 0.7% (premix), 1,000 mg, intravenous, Once PRN, Ophelia Potter MD    Facility-Administered Medications Ordered in Other Encounters:     bupivacaine PF (MARCAINE) 0.25 % injection, , epidural, PRN, Sharmin Herman MD, 8 mL at 24 1427    lidocaine-epinephrine (PF) (XYLOCAINE W/EPI) 1.5 %-1:200,000 injection, , epidural, PRN, Sharmin Herman MD, 3 mL at 24 1424    Assessment/Plan     Adeline Corral is a 33 y.o. female  at 40w5d admitted with induction of labor , meconium    1) FHR: Category I  2) GBS:  negative  3) to start pushing, continue pitocin, anticipate . NICU for delivery    Darrel Fair MD

## 2024-09-23 NOTE — ANESTHESIOLOGIST PRE-PROCEDURE ATTESTATION
Pre-Procedure Patient Identification:  I am the Primary Anesthesiologist and have identified the patient on 09/23/24 at 2:29 PM.   I have confirmed the procedure(s) will be performed by the following surgeon/proceduralist * No surgeons listed *.

## 2024-09-23 NOTE — PROGRESS NOTES
Labor and Delivery Progress Note    Subjective     Patient starting to get uncomfortable. Desires an epidrual  S/p SROM    Objective     Vital Signs for the last 24 hours:  Temp:  [36.5 °C (97.7 °F)-36.6 °C (97.9 °F)] 36.6 °C (97.9 °F)  Heart Rate:  [65-77] 69  Resp:  [16] 16  BP: (109-142)/(57-85) 142/85     Fetal Monitoring:  FHR Baseline: 150  FHR Variability: moderate  FHR Accelerations: present  FHR Decelerations: absent    Contraction Frequency: q5    IUPC: no    Latest cervical exam:  Cervical Dilation (cm): 3  Cervical Effacement: 80  Fetal Station: -2  Method: sterile exam per provider (24 1356)    Vaginal Bleeding: not present (24 1330)      Current Facility-Administered Medications:     calcium (as carbonate) (TUMS) chewable tablet 400 mg of elemental calcium, 400 mg of elemental calcium, oral, 3x daily PRN, Ophelia Potter MD    carboprost (HEMABATE) injection 250 mcg, 250 mcg, intramuscular, Once PRN, Ophelia Potter MD    diphenhydrAMINE (BENADRYL) capsule 50 mg, 50 mg, oral, Nightly PRN, Ophelia Potter MD    [] lactated ringer's bolus 1,000 mL, 1,000 mL, intravenous, Once **FOLLOWED BY** lactated ringer's infusion, 125 mL/hr, intravenous, Continuous, Ophelia Potter MD    methylergonovine (METHERGINE) injection 0.2 mg, 0.2 mg, intramuscular, Once PRN, Ophelia Potter MD    miSOPROStoL (CYTOTEC) tablet 1,000 mcg, 1,000 mcg, rectal, Once PRN, Ophelia Potter MD    miSOPROStoL (CYTOTEC) tablet 50 mcg, 50 mcg, oral, q4h [facility], [facility], 50 mcg at 24 0947    ondansetron ODT (ZOFRAN-ODT) disintegrating tablet 4 mg, 4 mg, oral, q6h PRN **OR** ondansetron (ZOFRAN) injection 4 mg, 4 mg, intravenous, q6h PRN, Ophelia Potter MD    oxytocin (PITOCIN) injection 10 Units, 10 Units, intramuscular, Once PRN, Ophelia Potter MD    tranexamic acid (CYKLOKAPRON) 1000 mg/100 mL sodium chloride 0.7% (premix), 1,000 mg, intravenous, Once PRN, Ophelia Potter MD    Assessment/Plan     Adeline Corral is a 33 y.o.  female  at 40w5d admitted with induction of labor at term    1) FHR: Category I  2) GBS:  negative  3) Epidural now. Will start pitocin     Keeley Rucker MD

## 2024-09-23 NOTE — ANESTHESIA PREPROCEDURE EVALUATION
Anesthesia ROS/MED HX    Anesthesia History - neg      Relevant Problems   RESPIRATORY SYSTEM   (+) ISIDRA (obstructive sleep apnea)     CBC Results         09/22/24 2042    WBC 8.81    RBC 3.48    HGB 9.5    HCT 28.7    MCV 82.5    MCH 27.3    MCHC 33.1                  Physical Exam    Airway   Mallampati: III   TM distance: <3 FB   Neck ROM: full  Cardiovascular - normal   Rhythm: regular   Rate: normalPulmonary - normal   clear to auscultation  Other Findings   Back - neg    landmarks identified          Anesthesia Plan    Plan: labor epidural       2 ASA  Anesthetic plan and risks discussed with: patient

## 2024-09-23 NOTE — ANESTHESIA PROCEDURE NOTES
Epidural Block    Patient location during procedure: OB  Start time: 9/23/2024 2:13 PM  End time: 9/23/2024 2:29 PM  Reason for block: labor analgesia requested by patient and obstetrician  Staffing  Performed: anesthesiologist   Anesthesiologist: Sharmin Herman MD  Performed by: Sharmin Herman MD  Authorized by: Sharmin Herman MD    Preanesthetic Checklist  Completed: patient identified, surgical consent, pre-op evaluation, timeout performed, IV checked, risks and benefits discussed, monitors and equipment checked and sterile field maintained during procedure  Needle  Needle type: Tuohy   Needle gauge: 17 G  Needle length: 3.5 in  Catheter type: Single orifice  Catheter size: 19 G  Catheter at skin depth: 11 cm  Test dose: negative and lidocaine 1.5% with epinephrine 1-to-200,000  Assessment  Sensory level: T10  Additional Notes  Procedure well tolerated. Vital signs stable. No paresthesia.  Analgesia satisfactory. Patients nurse to notify anesthesiologist if hemodynamically unstable. No CSF, No paresthesias, No heme. 1 smooth attempt

## 2024-09-23 NOTE — PROGRESS NOTES
Labor and Delivery Progress Note    Subjective     Doing well with epidural     Objective     Vital Signs for the last 24 hours:  Temp:  [36.5 °C (97.7 °F)-36.8 °C (98.2 °F)] 36.7 °C (98 °F)  Heart Rate:  [65-91] 82  Resp:  [16] 16  BP: (107-150)/(57-88) 116/64     Fetal Monitoring:  FHR Baseline: 150  FHR Variability: moderate  FHR Accelerations: present  FHR Decelerations: absent    Contraction Frequency: q2-3    IUPC: no    Latest cervical exam:  Cervical Dilation (cm): 8  Cervical Effacement: 100  Fetal Station: 0  Method: sterile exam per provider (24 1725)    Vaginal Bleeding: not present (24 1430)      Current Facility-Administered Medications:     calcium (as carbonate) (TUMS) chewable tablet 400 mg of elemental calcium, 400 mg of elemental calcium, oral, 3x daily PRN, Ophelia Potter MD    carboprost (HEMABATE) injection 250 mcg, 250 mcg, intramuscular, Once PRN, Ophelia Potter MD    diphenhydrAMINE (BENADRYL) capsule 50 mg, 50 mg, oral, Nightly PRN, Ophelia Potter MD    ePHEDrine sulfate-0.9%NaCl(PF) 50 mg/5 mL (10 mg/mL) injection 10 mg, 10 mg, intravenous, q10 min PRN, Sharmin Herman MD    fentaNYL-ROPivacaine-NaCl (PF) 2 mcg/mL - 0.15% PCEA syringe, , epidural, Continuous, Sharmin Herman MD, 50 mL at 24 1720    lactated ringer's bolus 500 mL, 500 mL, intravenous, Once, Sharmin Herman MD    [] lactated ringer's bolus 1,000 mL, 1,000 mL, intravenous, Once **FOLLOWED BY** lactated ringer's infusion, 125 mL/hr, intravenous, Continuous, Ophelia Potter MD, Last Rate: 125 mL/hr at 24 1410, 125 mL/hr at 24 1410    lactated ringer's infusion, 125 mL/hr, intravenous, Continuous **AND** oxytocin in 0.9 % NSS (PITOCIN) 30 unit/500 mL infusion, 0-20 esme-units/min, intravenous, Continuous, eKeley Rucker MD, Last Rate: 4 mL/hr at 24 1545, 4 esme-units/min at 24 1545    methylergonovine (METHERGINE) injection 0.2 mg, 0.2 mg, intramuscular, Once PRN, TariqOphelia MD    miSOPROStoL  (CYTOTEC) tablet 1,000 mcg, 1,000 mcg, rectal, Once PRN, Ophelia Potter MD    ondansetron ODT (ZOFRAN-ODT) disintegrating tablet 4 mg, 4 mg, oral, q6h PRN **OR** ondansetron (ZOFRAN) injection 4 mg, 4 mg, intravenous, q6h PRN, Ophelia Potter MD, 4 mg at 24 1622    oxytocin (PITOCIN) injection 10 Units, 10 Units, intramuscular, Once PRN, Ophelia Potter MD    tranexamic acid (CYKLOKAPRON) 1000 mg/100 mL sodium chloride 0.7% (premix), 1,000 mg, intravenous, Once PRN, Ophelia Potter MD    Facility-Administered Medications Ordered in Other Encounters:     bupivacaine PF (MARCAINE) 0.25 % injection, , epidural, PRN, Sharmin Herman MD, 8 mL at 24 1427    lidocaine-epinephrine (PF) (XYLOCAINE W/EPI) 1.5 %-1:200,000 injection, , epidural, PRN, Sharmin Herman MD, 3 mL at 24 1424    Assessment/Plan     Adeline Corral is a 33 y.o. female  at 40w5d admitted with induction of labor at term    1) FHR: Category I  2) GBS:  negative  3) Continue pitocin - patient making good cervical change    Keeley Rucker MD

## 2024-09-24 LAB
ERYTHROCYTE [DISTWIDTH] IN BLOOD BY AUTOMATED COUNT: 12.7 % (ref 11.7–14.4)
HCT VFR BLD AUTO: 28.5 % (ref 35–45)
HGB BLD-MCNC: 9.2 G/DL (ref 11.8–15.7)
MCH RBC QN AUTO: 26.7 PG (ref 28–33.2)
MCHC RBC AUTO-ENTMCNC: 32.3 G/DL (ref 32.2–35.5)
MCV RBC AUTO: 82.6 FL (ref 83–98)
PDW BLD AUTO: 11.6 FL (ref 9.4–12.3)
PLATELET # BLD AUTO: 245 K/UL (ref 150–369)
RBC # BLD AUTO: 3.45 M/UL (ref 3.93–5.22)
WBC # BLD AUTO: 16.9 K/UL (ref 3.8–10.5)

## 2024-09-24 PROCEDURE — 63700000 HC SELF-ADMINISTRABLE DRUG: Performed by: OBSTETRICS & GYNECOLOGY

## 2024-09-24 PROCEDURE — 12000000 HC ROOM AND CARE MED/SURG

## 2024-09-24 PROCEDURE — 36415 COLL VENOUS BLD VENIPUNCTURE: CPT | Performed by: OBSTETRICS & GYNECOLOGY

## 2024-09-24 PROCEDURE — 85027 COMPLETE CBC AUTOMATED: CPT | Performed by: OBSTETRICS & GYNECOLOGY

## 2024-09-24 RX ADMIN — ACETAMINOPHEN 650 MG: 325 TABLET ORAL at 09:26

## 2024-09-24 RX ADMIN — IBUPROFEN 600 MG: 600 TABLET, FILM COATED ORAL at 05:01

## 2024-09-24 RX ADMIN — DOCUSATE SODIUM AND SENNOSIDES 1 TABLET: 8.6; 5 TABLET, FILM COATED ORAL at 09:24

## 2024-09-24 RX ADMIN — IBUPROFEN 600 MG: 600 TABLET, FILM COATED ORAL at 12:57

## 2024-09-24 RX ADMIN — PRENATAL VIT W/ FE FUMARATE-FA TAB 27-0.8 MG 1 TABLET: 27-0.8 TAB at 09:24

## 2024-09-24 RX ADMIN — DOCUSATE SODIUM AND SENNOSIDES 1 TABLET: 8.6; 5 TABLET, FILM COATED ORAL at 20:54

## 2024-09-24 NOTE — PROGRESS NOTES
Obstetrics Postpartum Progress Note    Events  No acute events overnight.    Subjective  Pain: no  Bleeding: lochia minimal  Diet: taking regular diet  Voiding: without difficulty  Ambulating: as tolerated    Vitals  Temp:  [36.6 °C (97.9 °F)-37.4 °C (99.4 °F)] 36.6 °C (97.9 °F)  Heart Rate:  [] 69  Resp:  [16-20] 16  BP: (106-150)/(55-92) 116/67    I&O    Intake/Output Summary (Last 24 hours) at 2024 1047  Last data filed at 2024 0406  Gross per 24 hour   Intake --   Output 1075 ml   Net -1075 ml       Physical Exam  General: well  Heart:regular  Lungs: without issue  Abdomen: soft, nondistended, non-tender  Fundus: firm  Perineum: deferred  Extremities: symmetric    Labs  Labs Reviewed:  Lab Results   Component Value Date    ABO A 2024    LABRH Positive 2024        Assessment/Plan   Problem-based Assessment and Plan    Adeline Corral is a 33 y.o.  postpartum day 1 s/p Vaginal, Spontaneous.  Doing well-routine post partum care iwht discharge tomorrow-rev inst meds and follow up    1. Vital Signs: stable  2. Hemodynamics: stable  3. Pain: controlled  4. VTE Assessment: Early Ambulation  5. Vaccinations/Rhogam: rhogam not indicated   6. Postpartum care: meeting all goals     Deepa English DO

## 2024-09-24 NOTE — L&D DELIVERY NOTE
OB Vaginal Delivery Note    Delivery:t 8:22 PM  Patient:Adeline Corral  :1990    Review the Delivery Report for details.     Delivery Details    Pre-Op Diagnosis: 1. 33 y.o.  intrauterine pregnancy at 40w5d with palacio gestation.  2. Post Term  3. Meconium     Post-Op Diagnosis: 1. same   Delivery Clinician: Darrel Fair   Delivery Nurse;Nursery Nurse;Neonatologist Natalee Yuan;Tanya Ross;Margo Winkler   Delivery Type: Vaginal, Spontaneous   Labor Complications:  meconium   EBL: 250 mL   Anesthesia Type: Epidural   Placenta Delivery Spontaneous   Placenta Disposition:  discarded   Additional Specimens None      INFANT INFORMATION  Time of Birth:8:22 PM  Presentation: Vertex  Position:Left,Occiput,Anterior  Cord: 3 vessels,Complications:    Sex: female  [place] Weight: 3.87 kg (8 lb 8.5 oz)     1 Minute 5 Minute 10 Minute   Apgar Totals:   8   9        Information for the patient's :  Shayna, Girl [[id number]]     [place] Cord Gas       None            Delivery Details:    Adeline Corral is a 33 y.o.  at 40w5d gestation who presented to the hospital for Term pregnancy [Z34.90].  Her labor was was induced with dinoprostone ad oxytocin.  The patient progressed to fully dilated and pushed 36  minutes.  A viable  female infant was delivered by Vaginal, Spontaneous from Left,Occiput,Anterior.  The anterior and posterior shoulders delivered without difficulty followed by the remainder of the infant. A spontaneous cry was heard, and the infant appeared to be moving all 4 extremities and placed on maternal chest.. Delayed cord  clamping and cut with 3 vessels noted.  The placenta delivered spontaneously shortly thereafter.  Fundal massage was performed and the fundus was found to be firm, and lochia was within normal limits. The perineum, vagina, cervix were inspected, and the following lacerations were noted:      Episiotomy: None    Lacerations:   Perineal: None Repaired:      Periurethral:    Repaired:     Labial:   Repaired:     Sulcus:   Repaired:     Vaginal: Yes Repaired:     Cervical:    Repaired:        Any lacerations were repaired in the usual fashion using 2-0 Synthetic Suture suture. Excellent hemostasis was noted. At the completion of the case, sponge and needle counts were correct. The infant and patient were left in the delivery room in stable condition.     Attending Attestation: I was present and scrubbed for the entire procedure.    Darrel Fair MD

## 2024-09-24 NOTE — DISCHARGE SUMMARY
Inpatient Discharge Summary    BRIEF OVERVIEW  Admitting Provider: Ophelia Potter MD  Discharge Provider: Rukhsana Dykes MD  Primary Care Physician at Discharge: Tino Dowd [phone]     Admission Date: 2024     Discharge Date: 2024    Primary Discharge Diagnosis  Term pregnancy    Secondary Discharge Diagnosis    Discharge Disposition  Home     Discharge Medications     Medication List        CONTINUE taking these medications      PRENATAL GUMMIES ORAL  Take 1 Units by mouth daily.  Dose: 1 Units                  Test Results Pending at Discharge  Unresulted Labs (From admission, onward)      None            DETAILS OF HOSPITAL STAY    Presenting Problem/History of Present Illness  Term pregnancy [Z34.90]      Hospital Course/Operative Procedures Performed        Inpatient Discharge Summary    BRIEF OVERVIEW  Admitting Provider: Ophelia Potter MD  Discharge Provider: Rukhsana Dykes MD  Primary Care Physician at Discharge: Tino Dowd [phone]     Admission Date: 2024     Discharge Date: 2024    Primary Discharge Diagnosis  Term pregnancy    Secondary Discharge Diagnosis      Discharge Disposition  Home     Discharge Medications     Medication List        CONTINUE taking these medications      PRENATAL GUMMIES ORAL  Take 1 Units by mouth daily.  Dose: 1 Units                  Test Results Pending at Discharge  Unresulted Labs (From admission, onward)      None            DETAILS OF HOSPITAL STAY    Presenting Problem/History of Present Illness  Term pregnancy [Z34.90]      Hospital Course/Operative Procedures Performed

## 2024-09-24 NOTE — PLAN OF CARE
Problem: Adult Inpatient Plan of Care  Goal: Plan of Care Review  Outcome: Progressing  Flowsheets (Taken 2024)  Progress: improving  Outcome Evaluation:   at . Bleeding WNL. Breastfeeding. Pain controlled. VSS  Plan of Care Reviewed With:   patient   spouse  Goal: Patient-Specific Goal (Individualized)  Outcome: Progressing  Goal: Absence of Hospital-Acquired Illness or Injury  Outcome: Progressing  Goal: Optimal Comfort and Wellbeing  Outcome: Progressing  Goal: Readiness for Transition of Care  Outcome: Progressing   Plan of Care Review  Plan of Care Reviewed With: patient, spouse  Progress: improving  Outcome Evaluation:   at . Bleeding WNL. Breastfeeding. Pain controlled. VSS

## 2024-09-24 NOTE — PLAN OF CARE
Problem: Adult Inpatient Plan of Care  Goal: Plan of Care Review  Outcome: Progressing  Flowsheets (Taken 9/24/2024 0652)  Progress: improving  Outcome Evaluation: Breastfeeding, pain controlled, Bleeding WNL  Plan of Care Reviewed With: patient  Goal: Patient-Specific Goal (Individualized)  Outcome: Progressing  Goal: Absence of Hospital-Acquired Illness or Injury  Outcome: Progressing  Goal: Optimal Comfort and Wellbeing  Outcome: Progressing  Goal: Readiness for Transition of Care  Outcome: Progressing   Plan of Care Review  Plan of Care Reviewed With: patient  Progress: improving  Outcome Evaluation: Breastfeeding, pain controlled, Bleeding WNL

## 2024-09-24 NOTE — ANESTHESIA POSTPROCEDURE EVALUATION
Patient: Adeline Corral    Procedure Summary       Date: 09/23/24 Room / Location:     Anesthesia Start: 1413 Anesthesia Stop: 2022    Procedure: Labor Analgesia Diagnosis:     Scheduled Providers:  Responsible Provider: Sharmin Herman MD    Anesthesia Type: labor epidural ASA Status: 2            Anesthesia Type: labor epidural  PACU Vitals    No data found in the last 10 encounters.           Anesthesia Post Evaluation    Pain management: adequate  Patient location during evaluation: PACU  Patient participation: complete - patient participated  Level of consciousness: awake and alert  Cardiovascular status: acceptable  Airway Patency: adequate  Respiratory status: acceptable  Hydration status: acceptable  Anesthetic complications: no

## 2024-09-25 VITALS
BODY MASS INDEX: 31.98 KG/M2 | HEART RATE: 65 BPM | OXYGEN SATURATION: 96 % | HEIGHT: 68 IN | DIASTOLIC BLOOD PRESSURE: 66 MMHG | SYSTOLIC BLOOD PRESSURE: 123 MMHG | WEIGHT: 211 LBS | RESPIRATION RATE: 16 BRPM | TEMPERATURE: 98.1 F

## 2024-09-25 PROCEDURE — 63600000 HC DRUGS/DETAIL CODE: Performed by: OBSTETRICS & GYNECOLOGY

## 2024-09-25 PROCEDURE — 90656 IIV3 VACC NO PRSV 0.5 ML IM: CPT | Performed by: OBSTETRICS & GYNECOLOGY

## 2024-09-25 PROCEDURE — 63700000 HC SELF-ADMINISTRABLE DRUG: Performed by: OBSTETRICS & GYNECOLOGY

## 2024-09-25 PROCEDURE — G0008 ADMIN INFLUENZA VIRUS VAC: HCPCS | Performed by: OBSTETRICS & GYNECOLOGY

## 2024-09-25 RX ADMIN — DOCUSATE SODIUM AND SENNOSIDES 1 TABLET: 8.6; 5 TABLET, FILM COATED ORAL at 08:51

## 2024-09-25 RX ADMIN — PRENATAL VIT W/ FE FUMARATE-FA TAB 27-0.8 MG 1 TABLET: 27-0.8 TAB at 08:51

## 2024-09-25 RX ADMIN — IBUPROFEN 600 MG: 600 TABLET, FILM COATED ORAL at 08:51

## 2024-09-25 RX ADMIN — INFLUENZA VIRUS VACCINE 45 MCG: 15; 15; 15 SUSPENSION INTRAMUSCULAR at 09:52

## 2024-09-25 NOTE — LACTATION NOTE
BF progressing. Baby was cluster feeding overnight and PT is feeling somewhat discouraged. Feelings affirmed and support provided. Reviewed typical  feeding patterns and ways to tell baby is getting enough at the breast. She was able to pump twice and get a few cc's colostrum. Baby has had 1 large void in life. Discussed option to supplement per preference or if baby does not have another void by this afternoon. Went over supply/demand and importance of pumping to stimulate milk production. Reviewed education and importance of feeding on demand every 2-3 hours. Answered all questions and reviewed outpatient lactation resources in anticipation of discharge today.

## 2024-09-25 NOTE — PLAN OF CARE
Problem: Adult Inpatient Plan of Care  Goal: Plan of Care Review  Outcome: Met  Goal: Patient-Specific Goal (Individualized)  Outcome: Met  Goal: Absence of Hospital-Acquired Illness or Injury  Outcome: Met  Goal: Optimal Comfort and Wellbeing  Outcome: Met  Goal: Readiness for Transition of Care  Outcome: Met     Problem: Postpartum (Vaginal Delivery)  Goal: Successful Maternal Role Transition  Outcome: Met  Goal: Hemostasis  Outcome: Met  Goal: Absence of Infection Signs and Symptoms  Outcome: Met  Goal: Anesthesia/Sedation Recovery  Outcome: Met  Goal: Optimal Pain Control and Function  Outcome: Met  Goal: Effective Urinary Elimination  Outcome: Met     Problem: Breastfeeding  Goal: Effective Breastfeeding  Outcome: Met

## 2024-09-25 NOTE — LACTATION NOTE
BF progressing. PT reports baby latching generally well, she is experiencing some mild discomfort with the initial latch on. Assisted with positioning/latch strategies and after a few attempts, baby was able to latch deeply with sustained suck/swallow rhythm. Went over positioning, holding baby in close to the breast, and lining up nose to nipple for a deep latch. PT reports some improvement in comfort with a deeper latch.    Reviewed breastfeeding basics, normal  behavior, and importance of frequent, on demand/cue-based feeds at least 8-12x/day. Parents counseled on ways to tell baby is getting adequate volumes at the breast.     Discussed pumping after feeding at the breast due to hx PCOS. Set up pump and went over instructions for use and cleaning. Encouraged to feed on demand at least every 2-3 hours, pump after feeds at the breast, and feed baby any expressed colostrum. Answered all questions at this time. Made aware of LC availability and encouraged to call for assistance as needed.

## 2024-09-25 NOTE — PLAN OF CARE
Problem: Adult Inpatient Plan of Care  Goal: Plan of Care Review  Outcome: Progressing  Flowsheets (Taken 9/24/2024 5437)  Progress: improving  Outcome Evaluation: Patient is improving and expected to be discharged tomorrow. All paperwork is complete.  Plan of Care Reviewed With: patient  Goal: Patient-Specific Goal (Individualized)  Outcome: Progressing  Goal: Absence of Hospital-Acquired Illness or Injury  Outcome: Progressing  Goal: Optimal Comfort and Wellbeing  Outcome: Progressing  Goal: Readiness for Transition of Care  Outcome: Progressing     Problem: Postpartum (Vaginal Delivery)  Goal: Successful Maternal Role Transition  Outcome: Progressing  Goal: Hemostasis  Outcome: Progressing  Goal: Absence of Infection Signs and Symptoms  Outcome: Progressing  Goal: Anesthesia/Sedation Recovery  Outcome: Progressing  Goal: Optimal Pain Control and Function  Outcome: Progressing  Goal: Effective Urinary Elimination  Outcome: Progressing     Problem: Breastfeeding  Goal: Effective Breastfeeding  Outcome: Progressing   Plan of Care Review  Plan of Care Reviewed With: patient  Progress: improving  Outcome Evaluation: Patient is improving and expected to be discharged tomorrow. All paperwork is complete.